# Patient Record
(demographics unavailable — no encounter records)

---

## 2025-01-02 NOTE — PHYSICAL EXAM
[Normocephalic] : normocephalic [Atraumatic] : atraumatic [Supple] : supple [No Supraclavicular Adenopathy] : no supraclavicular adenopathy [No Cervical Adenopathy] : no cervical adenopathy [No Thyromegaly] : no thyromegaly [Normal Sinus Rhythm] : normal sinus rhythm [Examined in the supine and seated position] : examined in the supine and seated position [No dominant masses] : no dominant masses in right breast  [No dominant masses] : no dominant masses left breast [No Nipple Retraction] : no left nipple retraction [No Nipple Discharge] : no left nipple discharge [No Axillary Lymphadenopathy] : no left axillary lymphadenopathy [No Edema] : no edema [No Rashes] : no rashes [No Ulceration] : no ulceration [de-identified] : S/P MX/Ax/Implt w/o rec. %. No lymphedema.  [de-identified] : S/P MX/Implant w/o susp fx's. %. No lymphedema

## 2025-01-02 NOTE — HISTORY OF PRESENT ILLNESS
[FreeTextEntry1] : S/P L Proph MX/SLN/Implts (1/8/07)([place]): No Ca/atyp, -0/5 LN S/P R Mx/SLN/Ax/Implt (12/99)(P&S): +IDCA, PD, +LVI, +1/6 LN, ER+, VT+, Her2 - R Stage IIA (T1N1M0)  IDCA S/P chemo (AC x 4) S/P tmx x 5 yrs. S/P AI x 5yrs +FH Br Ca (Mother 70, M. Aunt 60's) BRCA (Invitae Panel)(5/23): BRCA-, +VUS MACARIO/BMPR1A Took Reclast x 2 yrs for osteopenia S/P R Ankle Fx (7/17) > Boot > Healed Daughter has recurrent atyp PAP's > seeing Dr LYLE Linares > observe Colonoscopy (2019): "WNL" > 5-7yrs  PAP/Pelvic (3/23): "WNL"  Got Pfizer booster (10/21) No MH/FH changes. ROS reviewed/discussed. Taking Ca/Vit D. Bone Density (3/24): +osteopenia w/ sig decr LS

## 2025-07-10 NOTE — HISTORY OF PRESENT ILLNESS
[FreeTextEntry1] : S/P L Proph MX/SLN/Implts (1/8/07)([place]): No Ca/atyp, -0/5 LN S/P R Mx/SLN/Ax/Implt (12/99)(P&S): +IDCA, PD, +LVI, +1/6 LN, ER+, ND+, Her2 - R Stage IIA (T1N1M0)  IDCA S/P chemo (AC x 4) S/P tmx x 5 yrs. S/P AI x 5yrs +FH Br Ca (Mother 70, M. Aunt 60's) BRCA (Invitae Panel)(5/23): BRCA-, +VUS MACARIO/BMPR1A Took Reclast x 2 yrs for osteopenia S/P R Ankle Fx (7/17) > Boot > Healed Daughter has recurrent atyp PAP's > seeing Dr LYLE Linares > observe Ca Score (7/5/25): 24 > mild risk, +R ML consolidation > discussed Colonoscopy (2019): "WNL" > 5-7yrs  PAP/Pelvic (3/23): "WNL"  Got Pfizer booster (10/21) No MH/FH changes. ROS reviewed/discussed. Taking Ca/Vit D. Bone Density (3/24): +osteopenia w/ sig decr LS

## 2025-07-10 NOTE — PHYSICAL EXAM
[Normocephalic] : normocephalic [Atraumatic] : atraumatic [Supple] : supple [No Supraclavicular Adenopathy] : no supraclavicular adenopathy [Normal Sinus Rhythm] : normal sinus rhythm [Examined in the supine and seated position] : examined in the supine and seated position [No dominant masses] : no dominant masses in right breast  [No dominant masses] : no dominant masses left breast [No Nipple Retraction] : no left nipple retraction [No Nipple Discharge] : no left nipple discharge [No Axillary Lymphadenopathy] : no left axillary lymphadenopathy [No Edema] : no edema [No Rashes] : no rashes [No Ulceration] : no ulceration [de-identified] : +prominent R thyroid > needs eval [de-identified] : S/P MX/Ax/Implt w/o rec. %. No lymphedema.  [de-identified] : S/P SSMX/SLN/Implant w/o susp fx's. %. No lymphedema